# Patient Record
(demographics unavailable — no encounter records)

---

## 2024-11-04 NOTE — PHYSICAL EXAM
[General Appearance - Well Developed] : well developed [Normal Appearance] : normal appearance [General Appearance - Well Nourished] : well nourished [No Deformities] : no deformities [Normal Conjunctiva] : the conjunctiva exhibited no abnormalities [Micrognathia] : micrognathia [III] : III [Neck Appearance] : the appearance of the neck was normal [Apical Impulse] : the apical impulse was normal [] : no respiratory distress [Respiration, Rhythm And Depth] : normal respiratory rhythm and effort [Exaggerated Use Of Accessory Muscles For Inspiration] : no accessory muscle use [Nail Clubbing] : no clubbing of the fingernails [Cyanosis, Localized] : no localized cyanosis [Oriented To Time, Place, And Person] : oriented to person, place, and time [Impaired Insight] : insight and judgment were intact [Affect] : the affect was normal [Mood] : the mood was normal [FreeTextEntry1] : Tremors

## 2024-11-04 NOTE — HISTORY OF PRESENT ILLNESS
[FreeTextEntry1] : 63-year-old female here for follow-up of REM sleep behavior disorder.  Comorbid medical conditions include Parkinson's syndrome, anxiety, and depression.  She notes increasing movement during sleep. She does not report increase in daytime symptoms but wakes up with blanket all over the place. She has not been displaced from the bed. She is on trazodone 50 mg at bedtime. Her sleep onset issues are better with trazodone.   Mack HST (12/6 - 12/7/2021) AHI 6, T90 0.5%. PSG (7/25/2022) AHI 3.2/hr. REM without atonia was observed.

## 2024-11-04 NOTE — ASSESSMENT
[FreeTextEntry1] : 63-year-old female with REM sleep behavior disorder in setting of Parkinson's disease. She has been having increased movements during sleep. Advised to discontinue trazodone. Continue melatonin 15 mg. Continue clonazepam 0.5 mg but will consider increasing to 1 mg if symptoms persist.  She can follow-up in 3 months, sooner if needed.

## 2024-11-04 NOTE — REVIEW OF SYSTEMS
[EDS: ESS=____] : daytime somnolence: ESS=[unfilled] [Fatigue] : fatigue [Nasal Congestion] : nasal congestion [Witnessed Apneas] : witnessed apnea [Shortness Of Breath] : no shortness of breath [A.M. Dry Mouth] : a.m. dry mouth [Chest Pain] : no chest pain [Palpitations] : no palpitations [Edema] : ~T edema was not present [CHF] : no congestive heart failure [Obesity] : not obese [Depression] : depression [Anxious] : anxious [Negative] : Constitutional

## 2024-11-19 NOTE — DISCUSSION/SUMMARY
[FreeTextEntry1] : Mrs. Cole is a 63 year old right handed woman here for follow up for essentail tremor refractory to medical mx s/p DBS placement. Patient has  long-standing history of severe depression, as well as epilepsy, who developed tremor of both hands since 1990s. tremors are progressively getting worse and interfere with ADLs.  no response from sinemet (caused nausea) selegiline, primidone and Neupro.  Not sure if propanolol was tried Status post left VIM on 10/7/2021 and left IPG on 10/25/2021, Abbott single array with improvement in right-sided tremors s/p Right VIm in Nov 2022   No changes made today. Tremor under good control fall precautions were discussed with her.  Offered physical therapy she declines at present time all questions addressed and answered.  Follow-up in 6-9 months sooner if needed Total time spent programming 8 minutes We discussed the above impression, plan and recommendation during the visit. Counseling represented more than 50% of the 30-minute visit time

## 2024-11-19 NOTE — PHYSICAL EXAM
[FreeTextEntry1] : Patient is awake and alert, pleasant and cooperative with the exam No tremors resting action or postural are seen today No dysmetria No bradykinesia   ?Slightly reduced finger taps on the left No difficulty getting up from chair, tends to veer a little, ambulates with a  rollator

## 2024-11-19 NOTE — PROCEDURE
[FreeTextEntry1] : [facility]  Left VIM 10/7/2021 Case +3 - Amplitude 2.05 Pulse width 70 Frequency 130 impedance 550  Right VIM 11/16/2022 Case + 10- Amplitude 2.05 Pulse width 100 Frequency 140  Impedance 550 System impedance OK  Patient  0-3 Battery  2.97V Ramp time 8 sec  Total time spent programming 8 minutes

## 2024-11-19 NOTE — HISTORY OF PRESENT ILLNESS
[FreeTextEntry1] : Patient is a 62 -year-old right-handed female here for follow up fro Essential tremor plus syndrome  s/p DBS left Vim 2021; rt vim 2022    Patient is here to learn more about deep brain stimulation surgery as a possible treatment option for her tremors.  She was evaluated by me November 2020 and wanted to think about DBS.  Today she states that she wishes to proceed with it.  The tremors are very bothersome and now are present in the legs also.  She tried Neupro which was not tolerated.  She does not remember what side effects she had on it.  States that she cannot tolerate levodopa and has vomiting soon after taking it.  Never felt any improvement in symptoms on it.  She has tried Artane and primidone also without any benefit.  She continues to be on Effexor trazodone and buspirone.   Past history - she states that she was diagnosed with parkinsonism in 2017 but she feels that she's had symptoms for many years prior to that. She's had tremors at least since the 1990s. The tremors are present all the time- at rest and also when she is using her hands. However with activity  it interferes the most. She feels that she has some tremor in her voice also.  Also with prolonged seating,she has a bad feeling in her legs. There  is temporary relief with movement. She does not drink any alcohol Drinks one cup of coffee a day and maybe her tremor is a little worse after that No family history of tremor  TATIANA scan-abnormal in 2016 Medications tried so far Sinemet-caused nausea, no help in symptoms Selegiline-no help Primidone-no help Artane- made symptoms worse Neupro- no help had side effects  Interval history-patient is status post left VIM DBS on 10/7/2021.  Left IPG Abbott on 10/25/2021 Patient presents for initial programming.  She reported slight improvement of right hand tremor after VIM DBS implantation.  She denies any other side effects  Interval history March 1, 2022.  Patient presents to follow-up on tremor.  Her right hand tremor is quite well controlled with the left VIM DBS.  She was interested in getting the right VIM for left-sided tremors however because of some GI issues nausea vomiting etc. had to postpone the surgery.  She denies any new concerns at present time GI issues have resolved.  She is comfortable using the stimulator and is able to make adjustments as needed.  She turns the stimulator off at night.  Interval History December 14, 2022 Pt presents for DBS programming.  She is s/p stage 1 right VIM-DBS 11/16/22 and stage 2 DBS with lead extension and IPG placement 12/5/22. - She reports some pain when the IPG was replaced and the new DBS wires under her chest. -There was some improvement in tremors of the left hand after surgery but now is at baseline  Interval history February 14, 2023.  Patient states that a week ago she noticed a sudden increase in tremors on both sides.  She states that she has been under some stress anxiety is worse given some family issues.  Her medications were recently changed she started back on trazodone 50 mg nightly she is off sertraline and BuSpar was doubled.  Interval history March 21, 2023 patient reporting some breakthrough tremor.  This was especially bad when she was due to have colonoscopy and saw Dr. Lora.  She states since then she has increased the settings and tremors are better.  Denies any side effects  Interval history August 15 2023 tremor got the little bit worse, she made changes to the DBS setting increased amplitude to 1.9  The tremor does not affect her day to day activities. the tremor affects her activities at night time when she shuts off the DBS Denies any side effects from the DBS. Caffeine make the tremor worse. Continues to have dysphagia, currently working with [facility] therapy Denies any falls, walks with walker for long distance. Denies any c/o constipation, denies any concerns with sleep or RBD, hallucination at this time.  Interval History 2/6/24 - tremors are well controlled, happy with DBS, did not make any changes to it since last visit. turns off stim at night, not on any meds for tremor control  Interval history November 19, 2024.  Patient presents to follow-up on tremors and DBS programming.  She states that the tremors are relatively well-controlled.  She had 1 fall a few days ago when she was at the supermarket and did not have her walker with her.  She scraped her knees.  No head injury.  Denies feeling lightheaded or dizzy.

## 2024-11-25 NOTE — HISTORY OF PRESENT ILLNESS
[FreeTextEntry1] : Pt returns today for a follow up appointment for migraine . Pt taking Qulipta 60mg  /day and tolerates it well and finds it helpful to prevent migraine .  When she has a migraine, will take Aleve and it is helpful to abort migraine.  Denies any new migraine symptoms.   Pt is working with Dr Fagan regarding PD. [Headache] : headache [Vomiting] : no Vomiting [Photophobia] : photophobia [Phonophobia] : phonophobia

## 2024-11-25 NOTE — PHYSICAL EXAM
[General Appearance - Alert] : alert [General Appearance - In No Acute Distress] : in no acute distress [General Appearance - Well Nourished] : well nourished [General Appearance - Well Developed] : well developed [General Appearance - Well-Appearing] : healthy appearing [] : normal voice and communication [Oriented To Time, Place, And Person] : oriented to person, place, and time [Mood] : the mood was normal [Cranial Nerves Facial (VII)] : face symmetrical [Paresis Pronator Drift Right-Sided] : no pronator drift on the right [Paresis Pronator Drift Left-Sided] : no pronator drift on the left [Motor Strength Upper Extremities Bilaterally] : strength was normal in both upper extremities [Motor Strength Lower Extremities Bilaterally] : strength was normal in both lower extremities [Sclera] : the sclera and conjunctiva were normal [PERRL With Normal Accommodation] : pupils were equal in size, round, reactive to light, with normal accommodation [Extraocular Movements] : extraocular movements were intact

## 2024-12-24 NOTE — PHYSICAL EXAM
[Normal Outer Ear/Nose] : the outer ears and nose were normal in appearance [Normal TMs] : both tympanic membranes were normal [No Carotid Bruits] : no carotid bruits [No Abdominal Bruit] : a ~M bruit was not heard ~T in the abdomen [Pedal Pulses Present] : the pedal pulses are present [No Edema] : there was no peripheral edema [Normal] : soft, non-tender, non-distended, no masses palpated, no HSM and normal bowel sounds [Normal Posterior Cervical Nodes] : no posterior cervical lymphadenopathy [Normal Anterior Cervical Nodes] : no anterior cervical lymphadenopathy [No CVA Tenderness] : no CVA  tenderness [No Spinal Tenderness] : no spinal tenderness [No Joint Swelling] : no joint swelling [Grossly Normal Strength/Tone] : grossly normal strength/tone [Coordination Grossly Intact] : coordination grossly intact [No Focal Deficits] : no focal deficits [Speech Grossly Normal] : speech grossly normal [Memory Grossly Normal] : memory grossly normal [Normal Affect] : the affect was normal [Alert and Oriented x3] : oriented to person, place, and time [Normal Mood] : the mood was normal [Normal Insight/Judgement] : insight and judgment were intact [Normal Oropharynx] : the oropharynx was normal

## 2024-12-24 NOTE — REVIEW OF SYSTEMS
[Fatigue] : fatigue [Hot Flashes] : hot flashes [Night Sweats] : night sweats [Vision Problems] : vision problems [Earache] : earache [Nasal Discharge] : nasal discharge [Chest Pain] : chest pain [Cough] : cough [Constipation] : constipation [Heartburn] : heartburn [Joint Pain] : joint pain [Muscle Weakness] : muscle weakness [Muscle Pain] : muscle pain [Back Pain] : back pain [Headache] : headache [Unsteady Walking] : ataxia [Insomnia] : insomnia [Anxiety] : anxiety [Depression] : depression [Negative] : Heme/Lymph

## 2024-12-24 NOTE — HEALTH RISK ASSESSMENT
[No] : In the past 12 months have you used drugs other than those required for medical reasons? No [I have developed a follow-up plan documented below in the note.] : I have developed a follow-up plan documented below in the note. [Former] : Former

## 2025-03-03 NOTE — ASSESSMENT
[FreeTextEntry1] : 63-year-old female with REM sleep behavior disorder in setting of Parkinson's disease. Now off trazodone. Continue melatonin 15 mg. Continue clonazepam 0.5 mg at present dose. High dosing did not improve wake ups.  She can follow-up in 6 months, sooner if needed.

## 2025-03-03 NOTE — HISTORY OF PRESENT ILLNESS
[FreeTextEntry1] : 63-year-old female here for follow-up of REM sleep behavior disorder.  Comorbid medical conditions include Parkinson's syndrome, anxiety, and depression.  Trialed clonazepam 1mg but was too strong so brought back down to 0.5 mg. With the 1 mg, she was unable to wake up until 10 - 11 AM. However, her sleep still remained fragmented. She is no longer on trazodone.  Mack HST (12/6 - 12/7/2021) AHI 6, T90 0.5%. PSG (7/25/2022) AHI 3.2/hr. REM without atonia was observed.

## 2025-03-03 NOTE — PHYSICAL EXAM
[General Appearance - Well Developed] : well developed [Normal Appearance] : normal appearance [General Appearance - Well Nourished] : well nourished [No Deformities] : no deformities [Normal Conjunctiva] : the conjunctiva exhibited no abnormalities [Neck Appearance] : the appearance of the neck was normal [] : no respiratory distress [Nail Clubbing] : no clubbing of the fingernails [Cyanosis, Localized] : no localized cyanosis [Oriented To Time, Place, And Person] : oriented to person, place, and time [Impaired Insight] : insight and judgment were intact [Affect] : the affect was normal [Mood] : the mood was normal [FreeTextEntry1] : Tremors

## 2025-03-19 NOTE — REVIEW OF SYSTEMS
[Poor Coordination] : poor coordination [Dizziness] : dizziness [Difficulty Walking] : difficulty walking [Frequent Falls] : frequent falls [Sleep Disturbances] : sleep disturbances [Depression] : depression [Emotional Problems] : emotional problems [Negative] : Integumentary [de-identified] : tremor, sleeping excessively

## 2025-03-19 NOTE — HISTORY OF PRESENT ILLNESS
[FreeTextEntry1] : Meds: lipitor 20mg qHS, effexor /d. buspirone 15mg bid. qvar.  diclofenac, trazadone 100mg qhs, clonazepam back to keppra 750mg bid, emgality, prevacid. ubrevy,  melatonin 10mg, hydroxyzine Trials:  LTG, artane 2mg tid d/c'd CBZ XR 800mg with no seizures p EMU 8/2017, no change in mood, while on LEV 1000 bid from [id number]. stopped selegiline, ?no change, some progression of motor symptoms.    off lithium  update:  +Stressors, mother ill with AD, cognitive decline, son not speaking to pt.    no ADRs, no change in mood, stable per pt.  taking buspar, effexor limited exercise.  using walker.  no recent falls, last fall while shopping 11/2024 using walker, took balance class/course 3/2025 no episodic confusion.   Chronic freq HA, seeing HA specialist.  feels strabismus may be contributing. HA nearly daily, but much better with emgality.  Seeing movement disorder MD for tremors, s/p 2021 and 2022 DBS, much milder vs pre-op walker for distance.  grab bar, shower chair.  unsteady walking still.  In Nov 2018 had issues with balance, falls, increased confusion when on LTG 150mg bid, while off LEV. had hit head with one fall. unclear what occurred (med tox, exacerbation of ataxia, vs concussion, vs seizure.). felt mood was not better off keppra either way.   trouble sleeping  after restarting LEV, lowering LTG, feels balance improved.  was previously on LEV 1000mg bid with no seizures reported for >3yrs.  Now 750mg bid x 2018.  MRIs from 2018, 2016. and 2014. No obvious changes in atrophy, and even white matter disease is fairly stable.  ROS:  strabismus, some diplopia with far right gaze.  ongoing issues with movement issues, tremor.  some LLE chronic dysesthesia, some radicular pain to calf chronic dysthymia/depression (no change off LEV).   PMHX: 61 yo RH F with Long-standing history of epilepsy (since about age 24), ataxia, tremor. Recent EEG did not demonstrate epileptiform abn.  Long history of depression. In 2015 she was hospitalized for suicidal ideations, and started on lithium.  She reports dizziness since being a teenager which was described as combination of lightheadedness and vertiginous sensations. She was not sure about specific precipitating factors.  She denies any tongue bite, bowel or bladder incontinence, shacking or jerking with it. She admits that most of the dizziness, lightheadedness are associated with change in the posture from sitting to standing up or while walking and very rarely associated without any change in the posture. She denies any racing of the heart while having lightheadedness or dizziness.  She reports to have been diagnosed with seizures since 24 years of age, unclear how this was diagnosed.  Reports at least 4 events of passing out over the years. She reports that she was diagnosed to have seizure based on EEG findings. She was not sure about the semiology of the seizures but admits to have loss of consciousness. She denies having any seizure since Nov 2013. She has been taking carbamazepine and Keppra for seizure prophylaxis. She denies any significant side effects with her AEDs at this time.  History of bilateral hand tremors since the late 90s, progressively getting worse. Was started on primidone for possible essential tremor, which has not clearly helped her.   Also Migraines, and cervical spinal stenosis. She had surgery for the stenosis in 2010, and more recently surgery for strabismus. Discovered to have cerebellar atrophy on MRI.  She feels her balance is poor and has fallen. She has been using a walker for the past 2 years. EMG demonstrated cervical radiculopathy.  [Lamotrigine (Lamictal)] : lamotrigine (Lamictal) [Levetiracetam (Keppra)] : levetiracetam (Keppra) [Phenobarbital] : phenobarbital

## 2025-03-19 NOTE — CONSULT LETTER
[( Thank you for referring [unfilled] for consultation for _____ )] : Thank you for referring [unfilled] for consultation for [unfilled] [Please see my note below.] : Please see my note below. [Consult Closing:] : Thank you very much for allowing me to participate in the care of this patient.  If you have any questions, please do not hesitate to contact me. [Sincerely,] : Sincerely, [DrElinor  ___] : Dr. SOTOMAYOR

## 2025-03-19 NOTE — DISCUSSION/SUMMARY
[FreeTextEntry1] : 62 yo RH F with a long-standing history of depression, relatively severe tremor, ataxia, recurrent syncope/LOC. The examination was significant for tremor, very mild cogwheeling, mild ataxia.   Strabismus chronically. Migraines and cervical pain.  S/p cervical fusion, LLE radicular discomfort. No recent seizures for the last 3 yrs+, now off CBZ, LTG train, back on LEV.   Mood an ongoing issue.  was no different off LEV on LTG. EEG not revealing 3/2016, prior VEEG at [facility] and  neurological were unrevealing.   VEEG at [facility] with subtle FIRDA/OIRDA with spike component per Dr Griffin. Denies h/o GTCS.    Possible first seizure in many yrs during transition from LTG to LEV, fell hit head ~2018.  Found per mother, had diarrhea, confused p fall.  Was more ataxic at that time.  Unclear to me if was due to med toxicity/fall/concussion vs seizure.  back on LEV for now.  s/p DBS bilat for tremors with improvement social stressors  Plan: /750mg.  no ADRs feels sz control better on this.  mood not really any better while off and on alt AED. f/u movement Steve BANSAL f/u Psych / therapist exercise recommended PT/OT f/u yearly and PRN x35 min [Safety Recommendations] : The patient was advised in regards to the risk of seizures and general seizure safety recommendations including not to be bathing alone, climbing to high places and operating heavy machinery. [Compliance with Medications] : The importance of compliance with medications was reinforced. [Medication Side Effects] : High frequency and serious potential medication adverse effects were reviewed with the patient, including but not exclusive to psychiatric effects.  Information sheets on medication side effects were made available to the patient in our clinic.  The patient or advocate agrees to notify us for any concerns. [EMU Consent:] : As per our usual protocol, staff discussed video EEG monitoring for the purpose of diagnosis, treatment decisions, teaching, research or publication (if de-identified).  Patient aware recording is continuous (24hrs/day), that they may be under constant observation (with exceptions), and that portions of the video EEG will be stored digitally.  In some cases AEDs may be adjusted to allow for observable seizure activity. The anticipated benefits of the study, the foreseeable risks associated with the procedure including experiencing seizures, which could be more severe than usual. The risk from seizures includes falls, fractures, muscle injury, dental injury, postictal psychiatric symptoms, and heart rate or breathing abnormalities. There will be a risk of experiencing skin irritation or breakdown from the electrodes being placed on the skin.  [Inpatient video EEG study ordered with length of stay anticipated in days: _____] : Inpatient video EEG study ordered with length of stay anticipated in days: [unfilled] [Evaluation for interictal epileptiform activity, subclinical seizures, and risk stratification (typically 2-3 days)] : Evaluation for interictal epileptiform activity, subclinical seizures, and risk stratification (typically 2-3 days) [Medication adjustment under a closely monitored setting due to the risk for seizures (4 days)] : Medication adjustment under a closely monitored setting due to the risk for seizures (4 days)

## 2025-03-26 NOTE — PHYSICAL EXAM
[Normal Outer Ear/Nose] : the outer ears and nose were normal in appearance [Normal Oropharynx] : the oropharynx was normal [No Carotid Bruits] : no carotid bruits [No Abdominal Bruit] : a ~M bruit was not heard ~T in the abdomen [Pedal Pulses Present] : the pedal pulses are present [No Edema] : there was no peripheral edema [Normal] : soft, non-tender, non-distended, no masses palpated, no HSM and normal bowel sounds [Normal Posterior Cervical Nodes] : no posterior cervical lymphadenopathy [Normal Anterior Cervical Nodes] : no anterior cervical lymphadenopathy [No CVA Tenderness] : no CVA  tenderness [No Spinal Tenderness] : no spinal tenderness [No Joint Swelling] : no joint swelling [Grossly Normal Strength/Tone] : grossly normal strength/tone [Coordination Grossly Intact] : coordination grossly intact [No Focal Deficits] : no focal deficits [Speech Grossly Normal] : speech grossly normal [Memory Grossly Normal] : memory grossly normal [Normal Affect] : the affect was normal [Alert and Oriented x3] : oriented to person, place, and time [Normal Mood] : the mood was normal [Normal Insight/Judgement] : insight and judgment were intact

## 2025-03-26 NOTE — HEALTH RISK ASSESSMENT
[No] : In the past 12 months have you used drugs other than those required for medical reasons? No [I have developed a follow-up plan documented below in the note.] : I have developed a follow-up plan documented below in the note. [Former] : Former [Audit-CScore] : 0 [de-identified] : following withchris BRAND

## 2025-03-26 NOTE — COUNSELING
[Fall prevention counseling provided] : Fall prevention counseling provided [Adequate lighting] : Adequate lighting [No throw rugs] : No throw rugs [Use proper foot wear] : Use proper foot wear [Behavioral health counseling provided] : Behavioral health counseling provided [Engage in a relaxing activity] : Engage in a relaxing activity [Plan in advance] : Plan in advance [None] : None [Good understanding] : Patient has a good understanding of lifestyle changes and steps needed to achieve self management goal

## 2025-03-26 NOTE — REVIEW OF SYSTEMS
[Fatigue] : fatigue [Hot Flashes] : hot flashes [Night Sweats] : night sweats [Vision Problems] : vision problems [Earache] : earache [Nasal Discharge] : nasal discharge [Chest Pain] : chest pain [Cough] : cough [Constipation] : constipation [Heartburn] : heartburn [Joint Pain] : joint pain [Muscle Weakness] : muscle weakness [Muscle Pain] : muscle pain [Back Pain] : back pain [Headache] : headache [Unsteady Walking] : ataxia [Insomnia] : insomnia [Anxiety] : anxiety [Depression] : depression [Negative] : Heme/Lymph [Fever] : no fever [Chills] : no chills [Recent Change In Weight] : ~T no recent weight change [Hearing Loss] : no hearing loss [Nosebleed] : no nosebleeds [Hoarseness] : no hoarseness [Sore Throat] : no sore throat [Palpitations] : no palpitations [Leg Claudication] : no leg claudication [Lower Ext Edema] : no lower extremity edema [Orthopnea] : no orthopnea [Paroxysmal Nocturnal Dyspnea] : no paroxysmal nocturnal dyspnea [Shortness Of Breath] : no shortness of breath [Wheezing] : no wheezing [Abdominal Pain] : no abdominal pain [Nausea] : no nausea [Diarrhea] : diarrhea [Vomiting] : no vomiting [Skin Rash] : no skin rash [Dizziness] : no dizziness [Fainting] : no fainting [Confusion] : no confusion [Memory Loss] : no memory loss [FreeTextEntry2] : wt gain [FreeTextEntry4] : dry mouth and dysphagia [FreeTextEntry7] : bloating, gas

## 2025-03-26 NOTE — HISTORY OF PRESENT ILLNESS
[FreeTextEntry1] : asthma, gerd, hld, osteopenia, oa, diverticulosis, parkinsonism, pharyngoesophageal dysphagia, cerebellar atrophy, cervical disc dz and spinal stenosis and radiculopathy, tremors, medial neuropathy, microvascular ischemic changes, convulsion d/o, chr headaches, lt shoulder tendonopathy, anx, dep, eye issues [de-identified] : Notes since last time reviewed.  Has seen ophtho, pulm, neurology. Reports that she saw derm goodman eczema at the hand - has been started on topical.   Pt is a 64 y/o female with a hx of mult med probs including asthma, gerd - following with Gi, hld, osteopenia, oa, diverticulosis, parkinsonism, pharyngoesophageal dysphagia, cerebellar atrophy, cervical disc dz and spinal stenosis and radiculopathy, tremors, medial neuropathy, microvascular ischemic changes, convulsion d/o, chr headaches, lt shoulder tendonopathy, anx, dep, eye issues - follows with  for meds, follows with 3 neurologists. has followed up with her neurologists, ophtho, neuropsych, rheumatology, pulm/sleep, BH and GI. s/p deep brain stimulator for her tremors on 10/7/21. s/p stage 2 11/16/22. Has been doing well with improved sx. Has followed up with neurosurg and neurology.  Has been getting the settings adjusted.  Has been started on Reclast. Was referred to rheum by Dr Mccracken - not able to give prolia with the migraine meds. Dexa had been reviewed - have not received the whole report - worsened rt hip, better at the spine and lt hip. f/u thyroid sono 11/21.  Has been on Prolia.  f/u Dexa improved with osteopenia - to continue with prolia.  Had injection since last time. Has been off the gabapentin. Has been following up with neurology and . Has been getting meds adjusted. Has been having headache meds adjusted with Dr Wilson. Psych meds have been adjusted.   Pt denies any CV sx. No hematological sx. No chest pains, palpitations, dyspnea, dizziness, edema, dyspnea, orthopnea. Had been exercising with ruma chi and boxing. not currently doing. c/o insomnia - Changed back to trazodone. Hydroxyzine was restarted for anx - meds were changed by pulm/sleep med - has followed up with  and Sleep med as noted. Still with insomnia. Has had meds adjusted - has prn benzo, trazodone dose increased. Meds adjusted at last visit.  Trazodone stopped.  to continue melatonin and clonazepam Still with pains at the rt hand and fingers/joints. As prev noted - With chr leg and arm pains. With chr le pain. with pins and needles at the legs. with joint aches. Has completed PT.  to start exercise program.  will decide if she needs to go back to PT for some worsened sx. no further syncopal episodes. No sz.  s/p laser surg at the eye. continues to follow. Had cataract surg. diplopia has been better with corrective prism adjustment. s/p plugs for the dry eye.  notes reviewed. With tremors/PD as noted - about the same. s/p nerve stimulator surgery as noted. has followed up and s/p stage 2 for the other side.  Has noted some inc on sx, to f/u with neurology/movement d/o.  has appt. With dysphagia - has been okay. following with the speech therapist - has transitioned from swallow therapy to speech. with dry mouth as noted. Has been going to speech therapy - notes on the chart Also asking for audiology for f/u and hearing testing.  Had seen Dr Dodson from GI with GI bloating - improved, gas, gerd. + constipation. About the same. no relief with rx for IBS. Has been on Senna since the surgery.  She will be going to f/u with GI.  Had f/u colon. with occ heat and flushing at the face. Still with spots under the lower lip and at the chin.  With rash at the lt thumb. Resolves with steroids, but then comes back.  Taking meds w/o issues.  no sx from the asthma - has not been taking inhalers. no chest tightness. no dyspnea, palpitations, dizziness. Has followed up with Dr Cleveland - has been well controlled. 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 Has been getting rx adjusted as noted,   Continues having shoulder pain - has been waxing and waning. occ gets really bad.  No noted CV sx. No chest pains, palpitations, dyspnea. no dizziness. Has been walking with walker. Occ does not need.  no noted hematological sx.  REprots that she was seen by home visit by insurance - dx'd with ? borderline PVD. she reports that the gait and balance has continued to be bad.  to start with exercise/balance class.  she will call if she thinks that she needs PT - would prefer home PT, but if needed, will have to go to the office.  Anx and dep - has been following with - has been having some depression. Has followed up - has new provider.  meds have been adjusted.  vaccines - tdap in '15. had pneumovax.  gets flu vaccine. Had with pulm Had covid vaccine x2 Had bivalent booster and variant booster Had shingrix x1 - due for #2 Had rsv vaccine  gyn - saw mammo - due for f/u routine screening colon - 3/23 - f/u in 7 years DExa - 7/23

## 2025-05-20 NOTE — PHYSICAL EXAM
[FreeTextEntry1] : Patient is awake and alert, pleasant and cooperative with the exam No tremors resting action or postural are seen today No dysmetria No bradykinesia   ?Slightly reduced finger taps on the left No difficulty getting up from chair, gait appeared okay while walking short distance without her walker.  Ambulates steadily with her walker

## 2025-05-20 NOTE — HISTORY OF PRESENT ILLNESS
[FreeTextEntry1] : Patient is a 62 -year-old right-handed female here for follow up fro Essential tremor plus syndrome  s/p DBS left Vim 2021; rt vim 2022    Patient is here to learn more about deep brain stimulation surgery as a possible treatment option for her tremors.  She was evaluated by me November 2020 and wanted to think about DBS.  Today she states that she wishes to proceed with it.  The tremors are very bothersome and now are present in the legs also.  She tried Neupro which was not tolerated.  She does not remember what side effects she had on it.  States that she cannot tolerate levodopa and has vomiting soon after taking it.  Never felt any improvement in symptoms on it.  She has tried Artane and primidone also without any benefit.  She continues to be on Effexor trazodone and buspirone.   Past history - she states that she was diagnosed with parkinsonism in 2017 but she feels that she's had symptoms for many years prior to that. She's had tremors at least since the 1990s. The tremors are present all the time- at rest and also when she is using her hands. However with activity  it interferes the most. She feels that she has some tremor in her voice also.  Also with prolonged seating,she has a bad feeling in her legs. There  is temporary relief with movement. She does not drink any alcohol Drinks one cup of coffee a day and maybe her tremor is a little worse after that No family history of tremor  TATIANA scan-abnormal in 2016 Medications tried so far Sinemet-caused nausea, no help in symptoms Selegiline-no help Primidone-no help Artane- made symptoms worse Neupro- no help had side effects  Interval history-patient is status post left VIM DBS on 10/7/2021.  Left IPG [employer] on 10/25/2021 Patient presents for initial programming.  She reported slight improvement of right hand tremor after VIM DBS implantation.  She denies any other side effects  Interval history March 1, 2022.  Patient presents to follow-up on tremor.  Her right hand tremor is quite well controlled with the left VIM DBS.  She was interested in getting the right VIM for left-sided tremors however because of some GI issues nausea vomiting etc. had to postpone the surgery.  She denies any new concerns at present time GI issues have resolved.  She is comfortable using the stimulator and is able to make adjustments as needed.  She turns the stimulator off at night.  Interval History December 14, 2022 Pt presents for DBS programming.  She is s/p stage 1 right VIM-DBS 11/16/22 and stage 2 DBS with lead extension and IPG placement 12/5/22. - She reports some pain when the IPG was replaced and the new DBS wires under her chest. -There was some improvement in tremors of the left hand after surgery but now is at baseline  Interval history February 14, 2023.  Patient states that a week ago she noticed a sudden increase in tremors on both sides.  She states that she has been under some stress anxiety is worse given some family issues.  Her medications were recently changed she started back on trazodone 50 mg nightly she is off sertraline and BuSpar was doubled.  Interval history March 21, 2023 patient reporting some breakthrough tremor.  This was especially bad when she was due to have colonoscopy and saw Dr. Lora.  She states since then she has increased the settings and tremors are better.  Denies any side effects  Interval history August 15 2023 tremor got the little bit worse, she made changes to the DBS setting increased amplitude to 1.9  The tremor does not affect her day to day activities. the tremor affects her activities at night time when she shuts off the DBS Denies any side effects from the DBS. Caffeine make the tremor worse. Continues to have dysphagia, currently working with [facility] therapy Denies any falls, walks with walker for long distance. Denies any c/o constipation, denies any concerns with sleep or RBD, hallucination at this time.  Interval History 2/6/24 - tremors are well controlled, happy with DBS, did not make any changes to it since last visit. turns off stim at night, not on any meds for tremor control  Interval history May 20, 2025 Patient presents to follow-up on tremors and DBS programming.  She states that the tremors are relatively well-controlled.  She had 1 fall a few days ago when she was getting out of bed trying to reach her slippers.  She lost her balance and fell.  No head injury.  Denies feeling lightheaded or dizzy.

## 2025-05-20 NOTE — DISCUSSION/SUMMARY
[FreeTextEntry1] : Mrs. Cole is a 63 year old right handed woman here for follow up for essentail tremor refractory to medical mx s/p DBS placement. Patient has  long-standing history of severe depression, as well as epilepsy, who developed tremor of both hands since 1990s. tremors are progressively getting worse and interfere with ADLs.  no response from sinemet (caused nausea) selegiline, primidone and Neupro.  Not sure if propanolol was tried Status post left VIM on 10/7/2021 and left IPG on 10/25/2021, Abbott single array with improvement in right-sided tremors s/p Right VIm in Nov 2022   No changes made today. Tremor under good control fall precautions were discussed with her.  Offered physical therapy, prescription provided all questions addressed and answered.  Follow-up in 6-9 months sooner if needed Total time spent programming 8 minutes We discussed the above impression, plan and recommendation during the visit. Counseling represented more than 50% of the 30-minute visit time

## 2025-05-20 NOTE — PROCEDURE
[FreeTextEntry1] : [facility]  Left VIM 10/7/2021 Case +3 - Amplitude 2.1 Pulse width 70 Frequency 130 impedance 550  Right VIM 11/16/2022 Case + 10- Amplitude 2.1 Pulse width 100 Frequency 140  Impedance 550 System impedance OK  Patient  0-3 Battery  2.96V Ramp time 8 sec  Total time spent programming 8 minutes

## 2025-06-06 NOTE — HISTORY OF PRESENT ILLNESS
[FreeTextEntry1] : Pt returns today for a follow up appointment for migraine . Pt taking Qulipta 60mg  /day and tolerates it well and finds it helpful to prevent migraine .  Has not needed Aleve.  Will use Diclofenac gel on neck of neck - headaches and neck pain are chronic.  Denies any new migraine symptoms.   Pt is working with Dr Fagan regarding PD. [Headache] : headache [Vomiting] : no Vomiting [Photophobia] : photophobia [Phonophobia] : phonophobia [Neck Pain] : neck pain [Daily] : daily

## 2025-06-06 NOTE — ASSESSMENT
[FreeTextEntry1] : Continue Qulipta 60mg / day for migraine prevention.  Diclofenac for neck pain/ headache Physical Therapy at [facility]